# Patient Record
Sex: FEMALE | Race: WHITE | NOT HISPANIC OR LATINO | URBAN - METROPOLITAN AREA
[De-identification: names, ages, dates, MRNs, and addresses within clinical notes are randomized per-mention and may not be internally consistent; named-entity substitution may affect disease eponyms.]

---

## 2021-10-06 ENCOUNTER — EMERGENCY (EMERGENCY)
Facility: HOSPITAL | Age: 26
LOS: 1 days | Discharge: HOME | End: 2021-10-06
Attending: EMERGENCY MEDICINE
Payer: COMMERCIAL

## 2021-10-06 VITALS
SYSTOLIC BLOOD PRESSURE: 108 MMHG | DIASTOLIC BLOOD PRESSURE: 66 MMHG | HEART RATE: 113 BPM | WEIGHT: 126.99 LBS | TEMPERATURE: 98 F | HEIGHT: 61 IN | RESPIRATION RATE: 20 BRPM | OXYGEN SATURATION: 100 %

## 2021-10-06 VITALS
DIASTOLIC BLOOD PRESSURE: 57 MMHG | RESPIRATION RATE: 18 BRPM | OXYGEN SATURATION: 100 % | HEART RATE: 92 BPM | SYSTOLIC BLOOD PRESSURE: 109 MMHG

## 2021-10-06 LAB
ALBUMIN SERPL ELPH-MCNC: 4.9 G/DL — SIGNIFICANT CHANGE UP (ref 3.3–5)
ALP SERPL-CCNC: 69 U/L — SIGNIFICANT CHANGE UP (ref 40–120)
ALT FLD-CCNC: 7 U/L — LOW (ref 10–45)
ANION GAP SERPL CALC-SCNC: 14 MMOL/L — SIGNIFICANT CHANGE UP (ref 5–17)
APPEARANCE UR: ABNORMAL
AST SERPL-CCNC: 10 U/L — SIGNIFICANT CHANGE UP (ref 10–40)
BACTERIA # UR AUTO: NEGATIVE — SIGNIFICANT CHANGE UP
BASE EXCESS BLDV CALC-SCNC: 2.1 MMOL/L — HIGH (ref -2–2)
BASOPHILS # BLD AUTO: 0.05 K/UL — SIGNIFICANT CHANGE UP (ref 0–0.2)
BASOPHILS # BLD AUTO: 0.06 K/UL — SIGNIFICANT CHANGE UP (ref 0–0.2)
BASOPHILS NFR BLD AUTO: 0.3 % — SIGNIFICANT CHANGE UP (ref 0–2)
BASOPHILS NFR BLD AUTO: 0.4 % — SIGNIFICANT CHANGE UP (ref 0–2)
BILIRUB SERPL-MCNC: 0.9 MG/DL — SIGNIFICANT CHANGE UP (ref 0.2–1.2)
BILIRUB UR-MCNC: NEGATIVE — SIGNIFICANT CHANGE UP
BLD GP AB SCN SERPL QL: NEGATIVE — SIGNIFICANT CHANGE UP
BUN SERPL-MCNC: 10 MG/DL — SIGNIFICANT CHANGE UP (ref 7–23)
CA-I SERPL-SCNC: 1.21 MMOL/L — SIGNIFICANT CHANGE UP (ref 1.15–1.33)
CALCIUM SERPL-MCNC: 9.8 MG/DL — SIGNIFICANT CHANGE UP (ref 8.4–10.5)
CHLORIDE BLDV-SCNC: 103 MMOL/L — SIGNIFICANT CHANGE UP (ref 96–108)
CHLORIDE SERPL-SCNC: 104 MMOL/L — SIGNIFICANT CHANGE UP (ref 96–108)
CO2 BLDV-SCNC: 26 MMOL/L — SIGNIFICANT CHANGE UP (ref 22–26)
CO2 SERPL-SCNC: 22 MMOL/L — SIGNIFICANT CHANGE UP (ref 22–31)
COLOR SPEC: COLORLESS — SIGNIFICANT CHANGE UP
CREAT SERPL-MCNC: 0.62 MG/DL — SIGNIFICANT CHANGE UP (ref 0.5–1.3)
DIFF PNL FLD: ABNORMAL
EOSINOPHIL # BLD AUTO: 0.01 K/UL — SIGNIFICANT CHANGE UP (ref 0–0.5)
EOSINOPHIL # BLD AUTO: 0.04 K/UL — SIGNIFICANT CHANGE UP (ref 0–0.5)
EOSINOPHIL NFR BLD AUTO: 0.1 % — SIGNIFICANT CHANGE UP (ref 0–6)
EOSINOPHIL NFR BLD AUTO: 0.3 % — SIGNIFICANT CHANGE UP (ref 0–6)
EPI CELLS # UR: 2 /HPF — SIGNIFICANT CHANGE UP
GAS PNL BLDV: 138 MMOL/L — SIGNIFICANT CHANGE UP (ref 136–145)
GAS PNL BLDV: SIGNIFICANT CHANGE UP
GAS PNL BLDV: SIGNIFICANT CHANGE UP
GLUCOSE BLDV-MCNC: 110 MG/DL — HIGH (ref 70–99)
GLUCOSE SERPL-MCNC: 110 MG/DL — HIGH (ref 70–99)
GLUCOSE UR QL: NEGATIVE — SIGNIFICANT CHANGE UP
HCG SERPL-ACNC: <2 MIU/ML — SIGNIFICANT CHANGE UP
HCG UR QL: NEGATIVE — SIGNIFICANT CHANGE UP
HCO3 BLDV-SCNC: 25 MMOL/L — SIGNIFICANT CHANGE UP (ref 22–29)
HCT VFR BLD CALC: 33.5 % — LOW (ref 34.5–45)
HCT VFR BLD CALC: 37.1 % — SIGNIFICANT CHANGE UP (ref 34.5–45)
HCT VFR BLDA CALC: 37 % — SIGNIFICANT CHANGE UP (ref 34.5–46.5)
HGB BLD CALC-MCNC: 12.3 G/DL — SIGNIFICANT CHANGE UP (ref 11.7–16.1)
HGB BLD-MCNC: 10.6 G/DL — LOW (ref 11.5–15.5)
HGB BLD-MCNC: 12 G/DL — SIGNIFICANT CHANGE UP (ref 11.5–15.5)
HYALINE CASTS # UR AUTO: 0 /LPF — SIGNIFICANT CHANGE UP (ref 0–2)
IMM GRANULOCYTES NFR BLD AUTO: 0.4 % — SIGNIFICANT CHANGE UP (ref 0–1.5)
IMM GRANULOCYTES NFR BLD AUTO: 0.6 % — SIGNIFICANT CHANGE UP (ref 0–1.5)
KETONES UR-MCNC: NEGATIVE — SIGNIFICANT CHANGE UP
LACTATE BLDV-MCNC: 1.5 MMOL/L — SIGNIFICANT CHANGE UP (ref 0.7–2)
LEUKOCYTE ESTERASE UR-ACNC: NEGATIVE — SIGNIFICANT CHANGE UP
LYMPHOCYTES # BLD AUTO: 1.37 K/UL — SIGNIFICANT CHANGE UP (ref 1–3.3)
LYMPHOCYTES # BLD AUTO: 1.65 K/UL — SIGNIFICANT CHANGE UP (ref 1–3.3)
LYMPHOCYTES # BLD AUTO: 11.4 % — LOW (ref 13–44)
LYMPHOCYTES # BLD AUTO: 9.4 % — LOW (ref 13–44)
MCHC RBC-ENTMCNC: 25.9 PG — LOW (ref 27–34)
MCHC RBC-ENTMCNC: 26.2 PG — LOW (ref 27–34)
MCHC RBC-ENTMCNC: 31.6 GM/DL — LOW (ref 32–36)
MCHC RBC-ENTMCNC: 32.3 GM/DL — SIGNIFICANT CHANGE UP (ref 32–36)
MCV RBC AUTO: 81 FL — SIGNIFICANT CHANGE UP (ref 80–100)
MCV RBC AUTO: 81.7 FL — SIGNIFICANT CHANGE UP (ref 80–100)
MONOCYTES # BLD AUTO: 0.76 K/UL — SIGNIFICANT CHANGE UP (ref 0–0.9)
MONOCYTES # BLD AUTO: 0.91 K/UL — HIGH (ref 0–0.9)
MONOCYTES NFR BLD AUTO: 5.2 % — SIGNIFICANT CHANGE UP (ref 2–14)
MONOCYTES NFR BLD AUTO: 6.3 % — SIGNIFICANT CHANGE UP (ref 2–14)
NEUTROPHILS # BLD AUTO: 11.73 K/UL — HIGH (ref 1.8–7.4)
NEUTROPHILS # BLD AUTO: 12.36 K/UL — HIGH (ref 1.8–7.4)
NEUTROPHILS NFR BLD AUTO: 81.3 % — HIGH (ref 43–77)
NEUTROPHILS NFR BLD AUTO: 84.3 % — HIGH (ref 43–77)
NITRITE UR-MCNC: NEGATIVE — SIGNIFICANT CHANGE UP
NRBC # BLD: 0 /100 WBCS — SIGNIFICANT CHANGE UP (ref 0–0)
NRBC # BLD: 0 /100 WBCS — SIGNIFICANT CHANGE UP (ref 0–0)
PCO2 BLDV: 34 MMHG — LOW (ref 39–42)
PH BLDV: 7.48 — HIGH (ref 7.32–7.43)
PH UR: 7.5 — SIGNIFICANT CHANGE UP (ref 5–8)
PLATELET # BLD AUTO: 221 K/UL — SIGNIFICANT CHANGE UP (ref 150–400)
PLATELET # BLD AUTO: 258 K/UL — SIGNIFICANT CHANGE UP (ref 150–400)
PO2 BLDV: 13 MMHG — LOW (ref 25–45)
POTASSIUM BLDV-SCNC: 3.8 MMOL/L — SIGNIFICANT CHANGE UP (ref 3.5–5.1)
POTASSIUM SERPL-MCNC: 3.9 MMOL/L — SIGNIFICANT CHANGE UP (ref 3.5–5.3)
POTASSIUM SERPL-SCNC: 3.9 MMOL/L — SIGNIFICANT CHANGE UP (ref 3.5–5.3)
PROT SERPL-MCNC: 7.6 G/DL — SIGNIFICANT CHANGE UP (ref 6–8.3)
PROT UR-MCNC: SIGNIFICANT CHANGE UP
RBC # BLD: 4.1 M/UL — SIGNIFICANT CHANGE UP (ref 3.8–5.2)
RBC # BLD: 4.58 M/UL — SIGNIFICANT CHANGE UP (ref 3.8–5.2)
RBC # FLD: 13.1 % — SIGNIFICANT CHANGE UP (ref 10.3–14.5)
RBC # FLD: 13.2 % — SIGNIFICANT CHANGE UP (ref 10.3–14.5)
RBC CASTS # UR COMP ASSIST: 4 /HPF — SIGNIFICANT CHANGE UP (ref 0–4)
RH IG SCN BLD-IMP: POSITIVE — SIGNIFICANT CHANGE UP
RH IG SCN BLD-IMP: POSITIVE — SIGNIFICANT CHANGE UP
SAO2 % BLDV: 21.1 % — LOW (ref 67–88)
SODIUM SERPL-SCNC: 140 MMOL/L — SIGNIFICANT CHANGE UP (ref 135–145)
SP GR SPEC: 1.01 — SIGNIFICANT CHANGE UP (ref 1.01–1.02)
UROBILINOGEN FLD QL: NEGATIVE — SIGNIFICANT CHANGE UP
WBC # BLD: 14.43 K/UL — HIGH (ref 3.8–10.5)
WBC # BLD: 14.65 K/UL — HIGH (ref 3.8–10.5)
WBC # FLD AUTO: 14.43 K/UL — HIGH (ref 3.8–10.5)
WBC # FLD AUTO: 14.65 K/UL — HIGH (ref 3.8–10.5)
WBC UR QL: 2 /HPF — SIGNIFICANT CHANGE UP (ref 0–5)

## 2021-10-06 PROCEDURE — 82947 ASSAY GLUCOSE BLOOD QUANT: CPT

## 2021-10-06 PROCEDURE — 81025 URINE PREGNANCY TEST: CPT

## 2021-10-06 PROCEDURE — 93975 VASCULAR STUDY: CPT | Mod: 26

## 2021-10-06 PROCEDURE — 84132 ASSAY OF SERUM POTASSIUM: CPT

## 2021-10-06 PROCEDURE — 99285 EMERGENCY DEPT VISIT HI MDM: CPT

## 2021-10-06 PROCEDURE — 86850 RBC ANTIBODY SCREEN: CPT

## 2021-10-06 PROCEDURE — 93005 ELECTROCARDIOGRAM TRACING: CPT

## 2021-10-06 PROCEDURE — 85014 HEMATOCRIT: CPT

## 2021-10-06 PROCEDURE — 83605 ASSAY OF LACTIC ACID: CPT

## 2021-10-06 PROCEDURE — 76830 TRANSVAGINAL US NON-OB: CPT

## 2021-10-06 PROCEDURE — 76830 TRANSVAGINAL US NON-OB: CPT | Mod: 26

## 2021-10-06 PROCEDURE — 86900 BLOOD TYPING SEROLOGIC ABO: CPT

## 2021-10-06 PROCEDURE — 82330 ASSAY OF CALCIUM: CPT

## 2021-10-06 PROCEDURE — 82803 BLOOD GASES ANY COMBINATION: CPT

## 2021-10-06 PROCEDURE — 93010 ELECTROCARDIOGRAM REPORT: CPT | Mod: 59

## 2021-10-06 PROCEDURE — 84702 CHORIONIC GONADOTROPIN TEST: CPT

## 2021-10-06 PROCEDURE — 74177 CT ABD & PELVIS W/CONTRAST: CPT | Mod: MA

## 2021-10-06 PROCEDURE — 74177 CT ABD & PELVIS W/CONTRAST: CPT | Mod: 26,MA

## 2021-10-06 PROCEDURE — 81001 URINALYSIS AUTO W/SCOPE: CPT

## 2021-10-06 PROCEDURE — 87086 URINE CULTURE/COLONY COUNT: CPT

## 2021-10-06 PROCEDURE — 84295 ASSAY OF SERUM SODIUM: CPT

## 2021-10-06 PROCEDURE — 96374 THER/PROPH/DIAG INJ IV PUSH: CPT | Mod: XU

## 2021-10-06 PROCEDURE — 80053 COMPREHEN METABOLIC PANEL: CPT

## 2021-10-06 PROCEDURE — 85025 COMPLETE CBC W/AUTO DIFF WBC: CPT

## 2021-10-06 PROCEDURE — 85018 HEMOGLOBIN: CPT

## 2021-10-06 PROCEDURE — 99284 EMERGENCY DEPT VISIT MOD MDM: CPT | Mod: 25

## 2021-10-06 PROCEDURE — 82435 ASSAY OF BLOOD CHLORIDE: CPT

## 2021-10-06 PROCEDURE — 93975 VASCULAR STUDY: CPT

## 2021-10-06 PROCEDURE — 86901 BLOOD TYPING SEROLOGIC RH(D): CPT

## 2021-10-06 RX ORDER — MORPHINE SULFATE 50 MG/1
2 CAPSULE, EXTENDED RELEASE ORAL ONCE
Refills: 0 | Status: DISCONTINUED | OUTPATIENT
Start: 2021-10-06 | End: 2021-10-06

## 2021-10-06 RX ORDER — SODIUM CHLORIDE 9 MG/ML
1000 INJECTION INTRAMUSCULAR; INTRAVENOUS; SUBCUTANEOUS ONCE
Refills: 0 | Status: COMPLETED | OUTPATIENT
Start: 2021-10-06 | End: 2021-10-06

## 2021-10-06 RX ADMIN — MORPHINE SULFATE 2 MILLIGRAM(S): 50 CAPSULE, EXTENDED RELEASE ORAL at 07:20

## 2021-10-06 RX ADMIN — MORPHINE SULFATE 2 MILLIGRAM(S): 50 CAPSULE, EXTENDED RELEASE ORAL at 06:43

## 2021-10-06 RX ADMIN — SODIUM CHLORIDE 1000 MILLILITER(S): 9 INJECTION INTRAMUSCULAR; INTRAVENOUS; SUBCUTANEOUS at 03:42

## 2021-10-06 NOTE — ED PROVIDER NOTE - NSFOLLOWUPINSTRUCTIONS_ED_ALL_ED_FT
Activities as tolerated. Please encourage good oral and fluid intake. For pain, please take Motrin 400mg every 4 hours as needed, or Tylenol 650mg every 6 hours as needed.    Please see your primary care doctor within 24-48 hours for further management of your symptoms.  Please follow up with your obgyn doctor in one week for further evaluation of your symptoms.    Please seek emergent medical management if you have any worsening signs or symptoms, such as worsening pain, persistent vomiting, loss of consciousness.

## 2021-10-06 NOTE — ED PROVIDER NOTE - OBJECTIVE STATEMENT
25 y/o F w/ no significant pmhx p/w abd pain. Began around periumbilical region and now predominantly in the RLQ. Pain began at 1230am and was severe, pt was unable to walk. At which time pt said she had nausea and chills. Pt denies cp or sob. Denies surgical hx to abd. Pt took 600mg of ibuprofen around 1115pm. Denies urinary sx, unusual vaginal discharge. Denies hx of kidney stones. Pt currently having her period and states this pain is much worse than her regular pain.

## 2021-10-06 NOTE — ED PROVIDER NOTE - PATIENT PORTAL LINK FT
You can access the FollowMyHealth Patient Portal offered by [facility] by registering at the following website: [email or web address]. By joining [facility]’s FollowMyHealth portal, you will also be able to view your health information using other applications (apps) compatible with our system.

## 2021-10-06 NOTE — ED PROVIDER NOTE - PROGRESS NOTE DETAILS
obgyn consulted ap- spoke w/ obgyn regarding case, pt signed out to me and pt now w/ worsening abd pain, called gyn resident who will be down to see the patient ap- spoke w/ obgyn regarding case, pt signed out to me and was seen by me, pt states initially she came in and had improvement in pain and now pain is worsening (prior to arrival pt took advil at home) called gyn resident who will be down to see the patient ARIAN Ahuja MD  obgyn team reassessed patient at bedside, attending states this is likely endometriosis, low concern for torsion at this time. Pending CBC repeat result, if stable, obgyn rec dc with clinic follow up and return precautions. Patient currently does not have any pain. spoke w/ obgyn regarding hgb they state it is ok that hgb dropped, pt is currently w/ improved pain,

## 2021-10-06 NOTE — ED ADULT NURSE NOTE - OBJECTIVE STATEMENT
stabbing pain RLQ and below umbilicus that began at home tonight; pt has menses  at this time; took 3 advil and 2 GasX at home stabbing pain RLQ and below umbilicus that began at home tonight; pt has menses at this time; took 3 advil and 2 GasX at home; no pmh or smyh

## 2021-10-06 NOTE — ED PROVIDER NOTE - PHYSICAL EXAMINATION
CONSTITUTIONAL: Well-developed; well-nourished; in no acute distress.   SKIN: warm, dry  HEAD: Normocephalic; atraumatic.  EYES: no conjunctival injection. PERRL.   ENT: No nasal discharge; airway clear.  NECK: Supple; non tender.  CARD: S1, S2 normal; no murmurs, gallops, or rubs. Regular rate and rhythm.   RESP: No wheezes, rales or rhonchi. Good air movement bilaterally.   ABD: +Mcburney's point.   EXT: Ambulates independently.  No cyanosis or edema.   NEURO: Alert, oriented, grossly unremarkable  PSYCH: Cooperative, appropriate.

## 2021-10-06 NOTE — ED PROVIDER NOTE - CLINICAL SUMMARY MEDICAL DECISION MAKING FREE TEXT BOX
O'Karely DO PGY-2: Pt p/w abd pain. O'Karely DO PGY-2: Pt p/w abd pain. DDx appy vs torsion. Ordered labs, imaging. Dispo pending workup. Upon initial eval pt states she does not want pain medication. Will reassess

## 2021-10-06 NOTE — ED ADULT TRIAGE NOTE - CHIEF COMPLAINT QUOTE
right pelvic pain radiating to umbilical region, 2 episodes of "near syncope" tunneled vision, hearing loss, numbness episode x1 hour ago

## 2021-10-06 NOTE — ED ADULT NURSE REASSESSMENT NOTE - NS ED NURSE REASSESS COMMENT FT1
Report received in [facility] in [facility]. Pt remains in the ED. Resting comfortably in bed. A&Ox3. Breathing spontaneously and unlabored. NAD. VSS. On cardiac monitor, NSR. Pt safety maintained. Will continue to monitor. Awaiting dispo.

## 2021-10-06 NOTE — CONSULT NOTE ADULT - ASSESSMENT
27y/o G0 LMP 10/4/2021 presenting to the ED complaining of severe abdominal pain. GYN consulted for r/o torsion. Patient currently in stable condition, abdominal exam notable RLQ, VSS, afebrile. WBC ct 14, H/H 12/37. TVUS with 4cm right ovarian cyst with flow. Differential includes endometrioma vs. intermittent torsion.   - No acute GYN intervention at this time  - IV pain control  - Repeat abdominal exam at 8am  - Repeat CBC at 7am  - final plan pending re-evaluation    Seen w/ Dr. Guilherme Olson, PGY2

## 2021-10-06 NOTE — CONSULT NOTE ADULT - SUBJECTIVE AND OBJECTIVE BOX
Gyn Consult Note  BETTY SMITH  26y  Female [id number]    HPI: 25y/o G0 LMP 10/4/2021 presenting to the ED complaining of severe abdominal pain. GYN consulted for r/o torsion.    Patient reports severe abdominal pain in the RLQ staring at 1230am this morning. She reports associated nausea and describes the pain as sharp. She has felt pain similar to this during the start of her last menstrual period. The pain lasted 1-2 weeks then subsided. She reports her periods are heavy and painful. She has never seen a gynecologist. She is sexually active, not on any contraception, denies pain with intercourse. She denies SOB, CP, palpitations, urinary symptoms, changes in bowel habits.    Name of GYN Physician: none    OBHx:  G0  GYNHx: Denies fibroids, cysts, endometriosis, STI's, Abnormal pap smears   PMHx: denies  PSHx: denies  Meds: none  Allergies: NKDA      Vital Signs Last 24 Hrs  T(C): 37.3 (06 Oct 2021 05:25), Max: 37.3 (06 Oct 2021 05:25)  T(F): 99.2 (06 Oct 2021 05:25), Max: 99.2 (06 Oct 2021 05:25)  HR: 85 (06 Oct 2021 05:25) (85 - 113)  BP: 97/47 (06 Oct 2021 05:25) (97/47 - 122/83)  BP(mean): --  RR: 18 (06 Oct 2021 05:25) (18 - 21)  SpO2: 100% (06 Oct 2021 05:25) (100% - 100%)    Physical Exam:   General: sitting comfortably in bed, NAD   CV: RRR  Lungs: CTAB  Abd: Soft, RLQ tenderness, non-distended.  Bowel sounds present.    Ext: non-tender b/l, no edema     LABS:  Pregnancy Profile, Urine: Negative (10-06-21 @ 03:37)                        12.0   14.65 )-----------( 258      ( 06 Oct 2021 03:32 )             37.1     10-06  140  |  104  |  10  ----------------------------<  110<H>  3.9   |  22  |  0.62    Ca    9.8      06 Oct 2021 03:32  TPro  7.6  /  Alb  4.9  /  TBili  0.9  /  DBili  x   /  AST  10  /  ALT  7<L>  /  AlkPhos  69  10    Urinalysis Basic - ( 06 Oct 2021 03:37 )  Color: Colorless / Appearance: Slightly Turbid / S.013 / pH: x  Gluc: x / Ketone: Negative  / Bili: Negative / Urobili: Negative   Blood: x / Protein: Trace / Nitrite: Negative   Leuk Esterase: Negative / RBC: 4 /hpf / WBC 2 /HPF   Sq Epi: x / Non Sq Epi: 2 /hpf / Bacteria: Negative    RADIOLOGY & ADDITIONAL STUDIES:  < from: US Transvaginal (10.06.21 @ 04:32) >  EXAM:  US DPLX PELVIC                        EXAM:  US TRANSVAGINAL                        PROCEDURE DATE:  10/06/2021    INTERPRETATION:  CLINICAL INFORMATION: Right lower quadrant pain for 4 hours. Patient reports prior history of similar pain associated with last menstrual cycle.  Negative beta-hCG performed on 10/6/2021.  LMP: 10/3/2021  COMPARISON: None available.  TECHNIQUE:  Endovaginal pelvic sonogram as per order. Transabdominal pelvic sonogram was also performed as part of our standard protocol. Color and Spectral Doppler was performed.  FINDINGS:  Uterus: 7.3 cm x 3.3 cm x 4.9 cm. Within normal limits.  Endometrium: 3 mm. Within normal limits.  Right ovary: 6.1 cm x 5.1 cm x 4.6 cm, inclusive of a 4.8 x 3.2 x 4.1 cm cyst containing a relatively homogeneous low-level echoes. Normal arterial and venous waveforms.  Left ovary: 2.5 cm x 1.6 cm x 1.9 cm. Within normal limits. Normal arterial and venous waveforms.  Fluid: Small free fluid adjacent to the right adnexa and within the cul-de-sac.  IMPRESSION:  4.8 cm right ovarian endometrioma versus hemorrhagic cyst. Given size, cyst may serve as a nidus for intermittent ovarian torsion, which should be excluded on a clinical basis.  --- End of Report ---    GEOFF LYLE MD; Resident Interventional Radiology  This document has been electronically signed.  ÁNGEL SILVA MD; Attending Radiologist  This document has been electronically signed. Oct  6 2021  4:48AM  < end of copied text >      < from: CT Abdomen and Pelvis w/ Oral Cont and w/ IV Cont (10.06.21 @ 05:09) >  EXAM:  CT ABDOMEN AND PELVIS OC IC                        PROCEDURE DATE:  10/06/2021    INTERPRETATION:  CLINICAL INFORMATION: Abdominal pain.  COMPARISON: Pelvic ultrasound performed on same day..  CONTRAST/COMPLICATIONS:  IV Contrast: Omnipaque 350  90 cc administered   10 cc discarded  Oral Contrast: Omnipaque 300  Complications: None reported at time of study completion  PROCEDURE:  CT of the Abdomen and Pelvis was performed.  Sagittal and coronal reformats were performed.  FINDINGS:  LOWER CHEST: Within normal limits.  LIVER: Subcentimeter right hepatic hypodensities are too small to characterize.  BILE DUCTS: Normal caliber.  GALLBLADDER: Within normal limits.  SPLEEN: Within normal limits.  PANCREAS: Within normal limits.  ADRENALS: Within normal limits.  KIDNEYS/URETERS: No renal stones or hydronephrosis.  BLADDER: Within normal limits.  REPRODUCTIVE ORGANS: 5.2 x 3.7 cm right adnexal lesion. Uterus and left adnexa are unremarkable.  BOWEL: No bowel obstruction. Appendix is normal.  PERITONEUM: No ascites.  VESSELS: Within normal limits.  RETROPERITONEUM/LYMPH NODES: No lymphadenopathy.  ABDOMINAL WALL: Tiny fat-containing umbilical hernia.  BONES: Within normal limits.  IMPRESSION:  Normal appendix.  Right adnexal lesion, corresponding to a complex cyst identified on pelvic sonogram performed earlier on the same day.  --- End of Report ---    GEOFF LYLE MD; Resident Interventional Radiology  This document has been electronically signed.  ÁNGEL SILVA MD; Attending Radiologist  This document has been electronically signed. Oct  6 2021  5:33AM  < end of copied text >

## 2021-10-06 NOTE — ED PROVIDER NOTE - NSFOLLOWUPCLINICS_GEN_ALL_ED_FT
[facility]  OB-GYN  [address] [place] [address]  Phone: [phone]  Fax:

## 2021-10-06 NOTE — CHART NOTE - NSCHARTNOTEFT_GEN_A_CORE
R2 Chart note    Patient seen at bedside for repeat abdominal exam. Patient sitting up comfortable in NAD. Patient states that Advil last night completely resolved her pain and Advil typically helps the best with her dysmenorrhea (similar pain with each menses). The pain returned this morning at about 6am, she received Morphine at 6:40am which improved the pain. Denies nausea, vomiting, fevers, headaches, CP, SOB. VS wnl this morning, hemodynamically stable and pain well controlled. Abdomen soft with minimal RLQ tenderness. No rebound or guarding.     A/P  26y G0 LMP 10/4 here w/ c/o RLQ pain with TVUS showing a  right sided 4.8cm ovarian cyst.  Blood flow demonstrated on US.  Patient with overall benign abdomen although with some mild tenderness to palpation on side of cyst.  Differential for pain includes pain from large space occupying ovarian cyst vs. endometrioma vs. possible ovarian cyst leakage (although less likely given no free fluid on TVUS) vs. ovarian torsion.   Low concern for torsion at this time given benign abdomen and clinical picture with overall well appearing, well mentating patient.  Given pain from cyst it was explained to patient that she would benefit from follow up with an OBGYN to discuss options including surveillance of cyst with repeat TVUS vs. outpatient scheduled laparoscopic ovarian cystectomy.  Explained need for follow up with OBGYN within one to two weeks to ensure resolution of pain vs. need for further intervention.  Reviewed with patient and mother reasons to return to ED including severe abdominal pain not responsive to pain medications or inability to tolerate PO intake.  Patient expressed understanding.  All questions/concerns addressed.     - no acute GYN intervention   - f/u outpatient with Dr. Cowan  - return ED precautions provided  - Motrin q6 hours when menstruating    seen with Dr. Bravo, Dr. Reynolds PGY4, TYLER Mosqueda PGY2

## 2021-10-06 NOTE — ED PROVIDER NOTE - ATTENDING CONTRIBUTION TO CARE
pt here due to sudden onset lower abd that woke her from sleep, severe, constant, with nausea. initially upper abd pain but now has localized to her RLQ. no previous hx of similar symptoms or any surgeries. on exam, pt is somewhat uncomfortable, with some RLQ tenderness. will obtain TVUS to r/o ovarian torsion, if no acute pathology there will obtain CT a/p to r/o kidney stone vs appy.

## 2021-10-07 LAB
CULTURE RESULTS: SIGNIFICANT CHANGE UP
SPECIMEN SOURCE: SIGNIFICANT CHANGE UP